# Patient Record
Sex: MALE | Race: OTHER | HISPANIC OR LATINO
[De-identification: names, ages, dates, MRNs, and addresses within clinical notes are randomized per-mention and may not be internally consistent; named-entity substitution may affect disease eponyms.]

---

## 2020-01-28 ENCOUNTER — APPOINTMENT (OUTPATIENT)
Dept: NEUROLOGY | Facility: CLINIC | Age: 75
End: 2020-01-28

## 2022-01-16 ENCOUNTER — TRANSCRIPTION ENCOUNTER (OUTPATIENT)
Age: 77
End: 2022-01-16

## 2022-01-18 ENCOUNTER — TRANSCRIPTION ENCOUNTER (OUTPATIENT)
Age: 77
End: 2022-01-18

## 2024-03-21 ENCOUNTER — APPOINTMENT (OUTPATIENT)
Dept: PULMONOLOGY | Facility: CLINIC | Age: 79
End: 2024-03-21
Payer: MEDICARE

## 2024-03-21 VITALS
TEMPERATURE: 97.9 F | SYSTOLIC BLOOD PRESSURE: 120 MMHG | WEIGHT: 156 LBS | OXYGEN SATURATION: 95 % | BODY MASS INDEX: 25.99 KG/M2 | DIASTOLIC BLOOD PRESSURE: 52 MMHG | HEART RATE: 87 BPM | HEIGHT: 65 IN

## 2024-03-21 DIAGNOSIS — R93.89 ABNORMAL FINDINGS ON DIAGNOSTIC IMAGING OF OTHER SPECIFIED BODY STRUCTURES: ICD-10-CM

## 2024-03-21 PROCEDURE — 99203 OFFICE O/P NEW LOW 30 MIN: CPT

## 2024-03-22 NOTE — PHYSICAL EXAM
[No Acute Distress] : no acute distress [Normal Appearance] : normal appearance [Normal Oropharynx] : normal oropharynx [No Neck Mass] : no neck mass [Normal Rate/Rhythm] : normal rate/rhythm [Normal S1, S2] : normal s1, s2 [No Resp Distress] : no resp distress [No Murmurs] : no murmurs [No Abnormalities] : no abnormalities [Clear to Auscultation Bilaterally] : clear to auscultation bilaterally [Benign] : benign [Normal Gait] : normal gait [No Clubbing] : no clubbing [No Cyanosis] : no cyanosis [No Edema] : no edema [FROM] : FROM [Normal Color/ Pigmentation] : normal color/ pigmentation [No Focal Deficits] : no focal deficits [Oriented x3] : oriented x3 [Normal Affect] : normal affect

## 2024-03-22 NOTE — DISCUSSION/SUMMARY
[FreeTextEntry1] : 78-year-old male with incidentally noted lung abnormality on abdominal CT.  I reviewed the images performed online as well as prior chest CT images from the previous year with both the patient and his wife was present throughout, which revealed similar small right lower lobe lung nodule, likely benign.  A repeat study will be performed in 1 year.  He is to follow-up with his urologist and PMD as before.

## 2024-03-22 NOTE — HISTORY OF PRESENT ILLNESS
[Never] : never [TextBox_4] : 78-year-old male presents for evaluation of abnormal lung finding on abdominal CT.  Study was performed as part of evaluation for renal stones.  The patient denies prior pulmonary problems or treatment.  He has never smoked.  He denies fever, chills, chest pain, hemoptysis, night sweats or weight loss. [TextBox_29] : Denies snoring, daytime somnolence, apneic episodes, AM headaches